# Patient Record
Sex: FEMALE | Race: BLACK OR AFRICAN AMERICAN | Employment: UNEMPLOYED | URBAN - METROPOLITAN AREA
[De-identification: names, ages, dates, MRNs, and addresses within clinical notes are randomized per-mention and may not be internally consistent; named-entity substitution may affect disease eponyms.]

---

## 2020-07-29 ENCOUNTER — HOSPITAL ENCOUNTER (OUTPATIENT)
Dept: GENERAL RADIOLOGY | Age: 55
Discharge: HOME | End: 2020-07-29

## 2020-07-29 DIAGNOSIS — R10.10 PAIN OF UPPER ABDOMEN: ICD-10-CM

## 2020-07-29 DIAGNOSIS — J41.0 SIMPLE CHRONIC BRONCHITIS (HCC): ICD-10-CM

## 2020-07-29 DIAGNOSIS — M54.50 CHRONIC BILATERAL LOW BACK PAIN WITHOUT SCIATICA: ICD-10-CM

## 2020-07-29 DIAGNOSIS — G89.29 CHRONIC BILATERAL LOW BACK PAIN WITHOUT SCIATICA: ICD-10-CM

## 2020-07-29 PROBLEM — Z79.899 ENCOUNTER FOR LONG-TERM (CURRENT) USE OF OTHER MEDICATIONS: Status: ACTIVE | Noted: 2020-07-29

## 2020-07-29 PROBLEM — E78.2 MIXED HYPERLIPIDEMIA: Status: ACTIVE | Noted: 2020-07-29

## 2020-07-29 PROBLEM — E66.09 CLASS 1 OBESITY DUE TO EXCESS CALORIES WITH SERIOUS COMORBIDITY AND BODY MASS INDEX (BMI) OF 32.0 TO 32.9 IN ADULT: Status: ACTIVE | Noted: 2020-07-29

## 2020-07-29 PROBLEM — E11.9 DIABETES MELLITUS (HCC): Status: ACTIVE | Noted: 2020-07-29

## 2020-07-29 PROBLEM — I10 ESSENTIAL HYPERTENSION: Status: ACTIVE | Noted: 2020-07-29

## 2021-06-16 ENCOUNTER — HOSPITAL ENCOUNTER (EMERGENCY)
Age: 56
Discharge: HOME | End: 2021-06-16
Attending: EMERGENCY MEDICINE
Payer: COMMERCIAL

## 2021-06-16 VITALS
HEIGHT: 64 IN | RESPIRATION RATE: 20 BRPM | HEART RATE: 83 BPM | BODY MASS INDEX: 30.73 KG/M2 | OXYGEN SATURATION: 100 % | WEIGHT: 180 LBS | DIASTOLIC BLOOD PRESSURE: 111 MMHG | TEMPERATURE: 98.8 F | SYSTOLIC BLOOD PRESSURE: 202 MMHG

## 2021-06-16 DIAGNOSIS — L03.011 FELON OF FINGER OF RIGHT HAND: Primary | ICD-10-CM

## 2021-06-16 PROCEDURE — 96372 THER/PROPH/DIAG INJ SC/IM: CPT

## 2021-06-16 PROCEDURE — 74011250636 HC RX REV CODE- 250/636: Performed by: EMERGENCY MEDICINE

## 2021-06-16 PROCEDURE — 99282 EMERGENCY DEPT VISIT SF MDM: CPT

## 2021-06-16 RX ORDER — OXYCODONE AND ACETAMINOPHEN 7.5; 325 MG/1; MG/1
1 TABLET ORAL
Qty: 12 TABLET | Refills: 0 | Status: SHIPPED | OUTPATIENT
Start: 2021-06-16 | End: 2021-06-19

## 2021-06-16 RX ORDER — CEPHALEXIN 500 MG/1
500 CAPSULE ORAL 2 TIMES DAILY
Qty: 14 CAPSULE | Refills: 0 | Status: SHIPPED | OUTPATIENT
Start: 2021-06-16 | End: 2021-06-23

## 2021-06-16 RX ORDER — MORPHINE SULFATE 10 MG/ML
10 INJECTION, SOLUTION INTRAMUSCULAR; INTRAVENOUS
Status: COMPLETED | OUTPATIENT
Start: 2021-06-16 | End: 2021-06-16

## 2021-06-16 RX ADMIN — MORPHINE SULFATE 10 MG: 10 INJECTION INTRAVENOUS at 21:05

## 2021-06-17 NOTE — ED PROVIDER NOTES
Patient is a 54-year-old female presented emerged from today complaining of pain to the right thumb. The patient states that she had her nails done 2 weeks ago and ever since then she has had pain on the right thumb but it is progressively worsened and gotten more swollen. Patient denies any fevers or chills. The patient said that she tried taking Lortab 5 mg for pain that she got from a friend and it helped calm the pain down but only for about an hour and then it came back. Past Medical History:   Diagnosis Date    Arthritis     Chronic pain     Diabetes ([address]     Hypertension     Mixed hyperlipidemia 7/29/2020       Past Surgical History:   Procedure Laterality Date    HX HYSTERECTOMY      HX ORTHOPAEDIC           Family History:   Problem Relation Age of Onset    Hypertension Mother     Hypertension Father     Hypertension Paternal Uncle     Diabetes Paternal Uncle        Social History     Socioeconomic History    Marital status:      Spouse name: Not on file    Number of children: Not on file    Years of education: Not on file    Highest education level: Not on file   Occupational History    Not on file   Tobacco Use    Smoking status: Current Every Day Smoker    Smokeless tobacco: Never Used   Substance and Sexual Activity    Alcohol use: Yes    Drug use: Never    Sexual activity: Yes     Partners: Male     Birth control/protection: None   Other Topics Concern    Not on file   Social History Narrative    Not on file     Social Determinants of Health     Financial Resource Strain:     Difficulty of Paying Living Expenses:    Food Insecurity:     Worried About Running Out of Food in the Last Year:     [address] in the Last Year:    Transportation Needs:     Lack of Transportation (Medical):      Lack of Transportation (Non-Medical):    Physical Activity:     Days of Exercise per Week:     Minutes of Exercise per Session:    Stress:     Feeling of Stress :    Social Connections:     Frequency of Communication with Friends and Family:     Frequency of Social Gatherings with Friends and Family:     Attends Yarsanism Services:     Active Member of Clubs or Organizations:     Attends Club or Organization Meetings:     Marital Status:    Intimate Partner Violence:     Fear of Current or Ex-Partner:     Emotionally Abused:     Physically Abused:     Sexually Abused: ALLERGIES: Propoxyphene and Propoxyphene n-acetaminophen    Review of Systems   Constitutional: Negative. Musculoskeletal: Positive for joint swelling. Skin: Positive for color change and wound. Neurological: Negative. Hematological: Negative. Vitals:    06/16/21 2043   BP: (!) 202/111   Pulse: 83   Resp: 20   Temp: 98.8 °F (37.1 °C)   SpO2: 100%   Weight: 81.6 kg (180 lb)   Height: 5' 4\" (1.626 m)            Physical Exam     GENERAL:The patient has Body mass index is 30.9 kg/m². Well-hydrated. Moderate discomfort  VITAL SIGNS: Heart rate, blood pressure, respiratory rate reviewed as recorded in  nurse's notes  EYES: Pupils reactive. Extraocular motion intact. No conjunctival redness or drainage. LUNGS: No accessory muscle use  CARDIOVASCULAR: Regular rate and rhythm  EXTREMITIES: Patient has limited range of motion with flexion of the right thumb secondary to pain. She has swelling over this area shown in the photos. No paronychia appreciated. NEUROLOGIC: Cranial nerve exam reveals face is symmetrical, tongue is midline  speech is clear. No focal deficits noted  SKIN: No rash or petechiae. Good skin turgor palpated. PSYCHIATRIC: Alert and oriented. Appropriate behavior and judgment.                 MDM  Number of Diagnoses or Management Options  Diagnosis management comments: Paronychia, felon,         Amount and/or Complexity of Data Reviewed  Tests in the medicine section of CPT®: reviewed and ordered           Procedures

## 2021-06-17 NOTE — DISCHARGE INSTRUCTIONS
When you call the orthopedic number in the morning tell them what you were diagnosed with and they will schedule an appointment to follow-up with hand specialist.  Start taking the antibiotics and using the pain medicine as directed. Risk of opioid analgesics include, but are not limited to: Overdose they can stop or slow your breathing and lead to death; fractures from falls; drowsiness leading to injury; tolerance, dependence and addiction. You should not operate any motorized vehicles or work from a height greater than ground level when taking opioid analgesics as they increase your fall risks.

## 2021-06-17 NOTE — ED TRIAGE NOTES
Arrives with face mask in place. Reports right thumb pain, onset approx 2 weeks ago after having acrylic fingernails done. Removed nail without relief of symptoms. Reports swelling to site. Attempted OTC meds as well as norco from friend without relief.

## 2021-09-24 ENCOUNTER — TELEPHONE (OUTPATIENT)
Dept: PHARMACY | Age: 56
End: 2021-09-24

## 2021-09-24 NOTE — TELEPHONE ENCOUNTER
CLINICAL PHARMACY: ADHERENCE REVIEW  Identified care gap per Sanjiv; fills at [facility]: ACE/ARB adherence    Last Visit: 20    Patient identified as LIS = 2, therefore patient may be able to receive a 90-day supply for the same cost as a 30-day supply    Patient also appears to be prescribed: metformin, pravastatin    ASSESSMENT  ACE/ARB ADHERENCE    Per Insurance Records through 21 ( [facility] = n/a; YTD PDC = 75%; Potential Fail Date: 21):   Lisinopril 10 mg tab last filled on 21 for 90 day supply. Next refill due: 21  Lisinopril-HCTZ 10-12.5 mg tab also on med list    Per current med list:  Medication Sig    lisinopriL (PRINIVIL, ZESTRIL) 10 mg tablet Take 1 Tab by mouth daily. - last Rx 20, Rx now ?  lisinopril-hydroCHLOROthiazide (PRINZIDE, ZESTORETIC) 10-12.5 mg per tablet Take 1 Tab by mouth daily. - last Rx 20, Rx now ? Per Reconciled Dispense Report: 3/8/21 to 21  LISINOPRIL  10 MG TABS 2021 90 90 Tablet TE QUIROGA [id number] [place] . .. Per [facility]:   Lisinopril last picked up as above. Would need new Rx for refill    BP Readings from Last 3 Encounters:   21 (!) 202/111   20 130/74   20 130/76     CrCl cannot be calculated (Patient's most recent lab result is older than the maximum 180 days allowed. ). DIABETES ADHERENCE    Per Insurance Records through 21: no claims yet in      Per current med list:  Medication Sig    metFORMIN (GLUCOPHAGE) 500 mg tablet Take 1 Tab by mouth two (2) times a day. - last Rx 20, Rx now ?      Per [facility]:   Metformin Rx now     Lab Results   Component Value Date/Time    Hemoglobin A1c 5.9 (H) 2020 02:27 PM    Hemoglobin A1c 5.8 2018 11:48 AM    Hemoglobin A1c 6.1 (H) 2018 12:22 PM     NOTE A1c not yet completed this calendar year    [facility]    Per Insurance Records through 21: no claims yet in     Per med list:  Medication Sig    pravastatin (PRAVACHOL) 40 mg tablet Take 1 Tab by mouth nightly. - Last Rx 21, Rx now ? Per [facility]:   Pravastatin Rx now     Lab Results   Component Value Date/Time    Cholesterol, total 198 2020 02:27 PM    HDL Cholesterol 58 2020 02:27 PM    DIRECT LDL CHOLESTEROL 121 2018 12:22 PM    LDL, calculated 116 (H) 2020 02:27 PM    VLDL, calculated 24 2020 02:27 PM    Triglyceride 119 2020 02:27 PM     ALT (SGPT)   Date Value Ref Range Status   2020 17 0 - 32 IU/L Final     AST (SGOT)   Date Value Ref Range Status   2020 17 0 - 40 IU/L Final     The ASCVD Risk score (Gilman Aase., et al., 2013) failed to calculate for the following reasons: The valid systolic blood pressure range is 90 to 200 mmHg     PLAN  The following are interventions that have been identified:  - Patient overdue refilling lisinopril and active on home medication list   · Lisinopril & lisinopril-HCTZ, and HCTZ all on med list (total dose lisinopril 20 mg daily, HCTZ 25) mg daily - would like to confirm use? · Will need refill Rx - Rxs now   - no claims yet for metformin or pravastatin but on medication list; Rxs now     Attempting to reach patient to review.  Left message asking for return call. Letter also sent. No future appointments.     Arias Justin, PharmD, [facility]  Department, toll free: [phone]

## 2021-09-24 NOTE — LETTER
[address]  [address], [place] [address]        Aye Prakash [address]  [address] [address]           21     Dear Reji Gabriel,    We tried to reach you recently regarding your lisinopril, but were unable to reach you on the telephone. We have on file that you are currently taking lisinopril 10 mg tablet - 1 tablet everyday. If you are no longer taking or taking differently, please call us at the number below so that we can discuss this and update your medication profile. It appears that this medication has not been filled at proper times and may be overdue to be refilled. We are worried you might be missing doses or not taking it as directed. It is important that you take your medications regularly and try not to miss a single dose. We also wanted to see if you have some of your other medications at home since it appears several of your prescriptions are now  and new prescriptions will be needed to refill them. Please contact us to discuss your medications further.     Sincerely,   Elver Gilliam PharmD, [facility]  Department, toll free: [phone]

## 2021-09-27 NOTE — TELEPHONE ENCOUNTER
Another attempt made to reach patient. No answer, LM. Letter already sent.     For Pharmacy Admin Tracking Only     Time Spent (min): 10

## 2022-03-18 PROBLEM — R10.10 PAIN OF UPPER ABDOMEN: Status: ACTIVE | Noted: 2020-07-29

## 2022-03-19 PROBLEM — Z79.899 ENCOUNTER FOR LONG-TERM (CURRENT) USE OF OTHER MEDICATIONS: Status: ACTIVE | Noted: 2020-07-29

## 2022-03-19 PROBLEM — E66.09 CLASS 1 OBESITY DUE TO EXCESS CALORIES WITH SERIOUS COMORBIDITY AND BODY MASS INDEX (BMI) OF 32.0 TO 32.9 IN ADULT: Status: ACTIVE | Noted: 2020-07-29

## 2022-03-19 PROBLEM — I10 ESSENTIAL HYPERTENSION: Status: ACTIVE | Noted: 2020-07-29

## 2022-03-19 PROBLEM — E11.9 DIABETES MELLITUS (HCC): Status: ACTIVE | Noted: 2020-07-29

## 2022-03-20 PROBLEM — E78.2 MIXED HYPERLIPIDEMIA: Status: ACTIVE | Noted: 2020-07-29

## 2022-04-26 ENCOUNTER — TELEPHONE (OUTPATIENT)
Dept: PHARMACY | Age: 57
End: 2022-04-26

## 2022-04-26 NOTE — LETTER
[address]  [address], [place] [address]        [place] [address] [place] [address]           04/26/22     Dear Brad Escalera,    We tried to reach you recently regarding some of your medications, but were unable to reach you on the telephone. We have on file that you are currently taking:  · Lisinopril-HCTZ 10-12.5 mg tablet - 1 tablet everyday  · Metformin 500 mg tablet - 1 tablet 2 times per day  · Pravastatin 40 mg tablet - 1 tablet everyday  If you are no longer taking or taking differently, please call us at the number below so that we can discuss this and update your medication profile. It appears that these medications have not been filled at proper times. We are worried you might be missing doses or not taking them as directed. It is important that you take your medications regularly and try not to miss a single dose. Please contact us to discuss further or follow up with your [facility] pharmacy to refill them.     Some ways to help you remember to take and refill your medications are to:  · Use a pill box, set an alarm, and/or keep your medication near something that you do every day  · Ask your pharmacy if they participate in [employer]", a program where you can  all of your medications on the same day  · Ask your pharmacy if you can be set up with automatic refill, where they will automatically refill your prescription when it is due and let you know it's ready to     Sincerely,   Maricarmen Garcia, PharmD, [facility]  Department, toll free: [phone]

## 2022-04-26 NOTE — TELEPHONE ENCOUNTER
[facility] CLINICAL PHARMACY: ADHERENCE REVIEW  Identified care gap per [employer]: fills at [facility]: ACE/ARB, Diabetes and Statin adherence    Last Visit: 1/5/22    Patient identified as LIS = 2, therefore patient may be able to receive a 90-day supply for the same cost as a 30-day supply    ASSESSMENT  ACE/ARB ADHERENCE    Insurance Records claims through 4/24/22 (2021 [place] = n/a; [place] = Filled only once; Potential Fail Date: 6/16/22): Lisinopril-HCTZ last filled on 1/5/22 for 90 day supply. Next refill due: 4/5/22    Per Reconciled Dispense Report: 10/8/21 to 1/7/22  LISINOPRIL/HYDROCHLOROTHIAZIDE  10-12.5 MG TABS 01/05/2022 90 90 Tablet KIMBER,TE [id number] [place] . .. Per [facility]:   Lisinopril-HCTZ RTS on 4/12/22. BP Readings from Last 3 Encounters:   01/05/22 (!) 187/100   06/16/21 (!) 202/111   08/24/20 130/74     Estimated Creatinine Clearance: 94.6 mL/min (by C-G formula based on SCr of 0.67 mg/dL). DIABETES ADHERENCE    Insurance Records claims through 4/24/22 (2021 [place] = n/a; [place] = Filled only once; Potential Fail Date: 5/2/22): Metformin last filled on 1/5/22 for 45 day supply. Next refill due: 2/19/22    Per Reconciled Dispense Report: 10/8/21 to 1/7/22  METFORMIN HYDROCHLORIDE  500 MG TABS 01/05/2022 45 90 Tablet KIMBER,TE [id number] [place] . .. Per [facility]:   Metformin RTS on 4/12/22    Lab Results   Component Value Date/Time    Hemoglobin A1c 6.0 (H) 01/05/2022 04:18 PM    Hemoglobin A1c 5.9 (H) 07/29/2020 02:27 PM    Hemoglobin A1c 5.8 07/17/2018 11:48 AM     NOTE A1c <9%    [id number] Records claims through 4/24/22 (2021 [place] = n/a; YTD PDC = Filled only once; Potential Fail Date: 6/16/22):   Pravastatin last filled on 1/5/22 for 90 day supply. Next refill due: 4/5/22    Per Reconciled Dispense Report: 10/8/21 to 1/7/22  PRAVASTATIN SODIUM  40 MG TABS 01/05/2022 90 90 Tablet TE QUIROGA [id number] [place] . ..      Per [facility]:   Pravastatin RTS on 4/12/22. Lab Results   Component Value Date/Time    Cholesterol, total 241 (H) 01/05/2022 04:18 PM    HDL Cholesterol 73 01/05/2022 04:18 PM    DIRECT LDL CHOLESTEROL 121 03/07/2018 12:22 PM    LDL, calculated 152 (H) 01/05/2022 04:18 PM    LDL, calculated 116 (H) 07/29/2020 02:27 PM    VLDL, calculated 16 01/05/2022 04:18 PM    VLDL, calculated 24 07/29/2020 02:27 PM    Triglyceride 92 01/05/2022 04:18 PM     ALT (SGPT)   Date Value Ref Range Status   01/05/2022 15 0 - 32 IU/L Final     AST (SGOT)   Date Value Ref Range Status   01/05/2022 16 0 - 40 IU/L Final     The 10-year ASCVD risk score (Chapito Clarke., et al., 2013) is: 61.4%    Values used to calculate the score:      Age: 61 years      Sex: Female      Is Non- : Yes      Diabetic: Yes      Tobacco smoker: Yes      Systolic Blood Pressure: 927 mmHg      Is BP treated: Yes      HDL Cholesterol: 73 mg/dL      Total Cholesterol: 241 mg/dL     PLAN  The following are interventions that have been identified:  - Patient overdue refilling lisinopril-HCTZ, metformin, pravastatin and active on home medication list   - Awaiting  at pharmacy with albuterol Rx    Attempting to reach patient to review.  Left message asking for return call. Specifically asked for call back if she is having troubles with medications, otherwise she had mentioned she was planning to  last month due to being about out. Will send letter to encourage adherence. No future appointments.     Geovanny Hdz, PharmD, [facility]  Department, toll free: [phone]

## 2022-04-29 NOTE — TELEPHONE ENCOUNTER
Another attempt made to reach patient, no answer. Letter already sent.     For Pharmacy [address] in place: No   Recommendation Provided To: Pharmacy: 3   Intervention Detail: Adherence Monitoring: 3   Gap Closed?: No   Intervention Accepted By: Pharmacy: 3   Time Spent (min): 10

## 2022-06-01 ENCOUNTER — TELEPHONE (OUTPATIENT)
Dept: PHARMACY | Age: 57
End: 2022-06-01

## 2022-06-01 ENCOUNTER — TELEPHONE (OUTPATIENT)
Dept: PHARMACY | Facility: CLINIC | Age: 57
End: 2022-06-01

## 2022-06-01 NOTE — TELEPHONE ENCOUNTER
[facility] CLINICAL PHARMACY: ADHERENCE REVIEW  Identified care gap per [employer]: fills at [facility]: ACE/ARB, Diabetes and Statin adherence    Last Visit: 1/5/22    Patient identified as LIS = 2, therefore patient may be able to receive a 90-day supply for the same cost as a 30-day supply    ASSESSMENT  ACE/ARB ADHERENCE    Insurance Records claims through 5/23/22 (Prior Year Jose Daniel Garciaandra = n/a%; YTD [place] = Filled only once; Potential Fail Date: 6/16/22): Lisinopril-HCTZ 10-12.5 mg tab last filled on 1/5/22 for 90 day supply. Next refill due: 4/5/22    Per Reconciled Dispense Report:  LISINOPRIL/HYDROCHLOROTHIAZIDE  10-12.5 MG TABS 05/27/2022 90 90 tablet CAMRON,KRYSTYNA [id number] [place] . .. LISINOPRIL/HYDROCHLOROTHIAZIDE  10-12.5 MG TABS 01/05/2022 90 90 tablet CAMRON,KRYSTYNA [id number] [place] . .. BP Readings from Last 3 Encounters:   01/05/22 (!) 187/100     Estimated Creatinine Clearance: 99 mL/min (based on SCr of 0.67 mg/dL). DIABETES ADHERENCE    Insurance Records claims through 5/23/22 (Prior Year Jose Daniel Huff = n/a%; YTD [place] = Filled only once; Potential Fail Date: IMPOSSIBLE IN 2022): Metformin 500 mg tab last filled on 1/5/22 for 45 day supply. Next refill due: 2/19/22    Per Reconciled Dispense Report:  METFORMIN HYDROCHLORIDE  500 MG TABS 05/27/2022 45 90 tablet CAMRON,KRYSTYNA [id number] [place] . .. METFORMIN HYDROCHLORIDE  500 MG TABS 01/05/2022 45 90 tablet CAMRON,KRYSTYNA [id number] [place] . .. Lab Results   Component Value Date    LABA1C 6.0 (H) 01/05/2022    LABA1C 5.9 (H) 07/29/2020     NOTE A1c <9%    STATIN ADHERENCE    Insurance Records claims through 5/23/22 (Prior Year PDC = n/a%; YTD PDC = Filled only once; Potential Fail Date: 6/16/22):   Pravastatin 40 mg tab last filled on 1/5/22 for 90 day supply. Next refill due: 4/5/22    Per Reconciled Dispense Report:  PRAVASTATIN SODIUM  40 MG TABS 05/27/2022 90 90 tablet KRYSTYNA LYON [id number] [place] . ..    PRAVASTATIN SODIUM  40 MG TABS 01/05/2022 90 90 tablet KRYSTYNA LYON [id number] [place] . .. Lab Results   Component Value Date    CHOL 241 (H) 01/05/2022    TRIG 92 01/05/2022    HDL 73 01/05/2022    LDLCALC 152 (H) 01/05/2022     ALT   Date Value Ref Range Status   01/05/2022 15 0 - 32 IU/L Final     AST   Date Value Ref Range Status   01/05/2022 16 0 - 40 IU/L Final     The 10-year ASCVD risk score (Armen Becker, et al., 2013) is: 61.4%    Values used to calculate the score:      Age: 61 years      Sex: Female      Is Non- : Yes      Diabetic: Yes      Tobacco smoker: Yes      Systolic Blood Pressure: 633 mmHg      Is BP treated: Yes      HDL Cholesterol: 73 mg/dL      Total Cholesterol: 241 mg/dL     PLAN  The following are interventions that have been identified:   - Patient overdue refilling lisinopril-HCTZ, metformin, pravastatin and active on home medication list.    - did not call pharmacy today - had been filled 2 times previously and RTS since pt did not ; appears all possibly processed 5/27/22 at [facility] but no claim in [facility] yet at time of writing    Attempting to reach patient to review.  Left message asking for return call. Have been unsuccessful reaching patient after multiple attempts. Will send another letter. No future appointments.     Rogelio Tidwell, PharmD, [facility]  Department, toll free: [phone]     For Pharmacy Admin Tracking Only     Gap Closed?: No    Time Spent (min): 10

## 2022-06-01 NOTE — LETTER
[address]  [address], [address]        [facility] [address] [place] [address]           06/01/22     Dear Rick Mendosa,    We tried to reach you recently regarding some of your maintenance medicatiosn, but were unable to reach you on the telephone. We have on file that you are currently taking:  - lisinopril-hydrochlorothiazide 10-12.5 mg tablet - 1 tablet everyday  - metformin 500 mg tablet - 1 tablet 2 times per day  - pravastatin 40 mg tablet - 1 tablet everyday  If you are no longer taking or taking differently, please call us at the number below so that we can discuss this and update your medication profile. We wanted to make sure you have these medications at home and that you are not having any troubles with them. It appears that these medications have not been filled at proper times. We are worried you might be missing doses or not taking it as directed. It is important that you take your medications regularly and try not to miss a single dose.     Some ways to help you remember to take and refill your medications are to:  · Use a pill box, set an alarm, and/or keep your medication near something that you do every day  · Ask your pharmacy if they participate in [facility]", a program where you can  all of your medications on the same day  · Ask your pharmacy if you can be set up with automatic refill, where they will automatically refill your prescription when it is due and let you know it's ready to     Sincerely,   Cande Asencio, KathyD, [facility]  Department, toll free: [phone]

## 2022-09-01 RX ORDER — LISINOPRIL AND HYDROCHLOROTHIAZIDE 12.5; 1 MG/1; MG/1
1 TABLET ORAL DAILY
Qty: 90 TABLET | Refills: 1 | Status: SHIPPED | OUTPATIENT
Start: 2022-09-01

## 2022-09-01 NOTE — TELEPHONE ENCOUNTER
Melquiades Grimaldo MD, patient out of refills of lisinopril-HCTZ. Rx pended for your signature/modification as appropriate    LOV: 1/5/22  Next: to be scheduled (please have staff assist if needing appt for refills)    Thank you,  Angelica Hdz PharmD, [facility]  Department, toll free: [phone] option 2  ====================================================================  POPULATION [facility] CLINICAL PHARMACY: ADHERENCE REVIEW  Identified care gap per [employer]: fills at [facility] : ACE/ARB, Diabetes, and Statin adherence    Last Visit: 1/5/22    Patient identified as LIS = 2, therefore patient may be able to receive a 90-day supply for the same cost as a 30-day supply    ASSESSMENT  ACE/ARB ADHERENCE    Insurance Records claims through 8/19/22 (Prior Year Jose Daniel Huff = FAILED; YTD Jose Daniel Huff =  77%; Potential Fail Date: 9/14/22 ):   Lisinopril-HCTZ 10-12.5 mg tab last filled on 5/27/22 for 90 day supply. Next refill due: 8/25/22    Per Reconciled Dispense Report:  LISINOPRIL/HYDROCHLOROTHIAZIDE  10-12.5 MG TABS 05/27/2022 90 90 tablet CAMRON,KRYSTYNA [id number] [place] . .. LISINOPRIL/HYDROCHLOROTHIAZIDE  10-12.5 MG TABS 01/05/2022 90 90 tablet CAMRON,KRYSTYNA [id number] [place] . .. Last Rx 3/28/22 x 90 ds 0 refills    BP Readings from Last 3 Encounters:   01/05/22 (!) 187/100     CrCl cannot be calculated (Patient's most recent lab result is older than the maximum 180 days allowed. ). DIABETES ADHERENCE    Insurance Records claims through 8/19/22 (Prior Year Jose Daniel Huff = n/a; YTD Jose Daniel Davisra =  39%; Failed in 2022): Metformin 500 mg tab last filled on 5/27/22 for 45 day supply (90 tabs). Next refill due: 7/11/22    Per Reconciled Dispense Report:  METFORMIN HYDROCHLORIDE  500 MG TABS 05/27/2022 45 90 tablet KRYSTYNA LYON [id number] [place] . ..    METFORMIN HYDROCHLORIDE  500 MG TABS          Lab Results   Component Value Date    LABA1C 6.0 (H) 01/05/2022    LABA1C 5.9 (H) 07/29/2020     NOTE A1c <9%    STATIN ADHERENCE    Insurance Records claims through 8/19/22 (Prior Year PDC = n/a; YTD [place] =  77%; Potential Fail Date: 9/14/22 ):   Pravastatin 40 mg last filled on 5/27/22 for 90 day supply. Next refill due: 8/25/22    Per Reconciled Dispense Report:  PRAVASTATIN SODIUM  40 MG TABS 05/27/2022 90 90 tablet CAMRON,KRYSTYNA [id number] [place] . .. PRAVASTATIN SODIUM  40 MG TABS 01/05/2022 90 90 tablet CAMRON,KRYSTYNA [id number] [place] . .. Last Rx 1/5/22 x 90 ds 5 refills, believe should have refill on file    Lab Results   Component Value Date    CHOL 241 (H) 01/05/2022    TRIG 92 01/05/2022    HDL 73 01/05/2022    LDLCALC 152 (H) 01/05/2022     ALT   Date Value Ref Range Status   01/05/2022 15 0 - 32 IU/L Final     AST   Date Value Ref Range Status   01/05/2022 16 0 - 40 IU/L Final     The 10-year ASCVD risk score (Odell Mccall, et al., 2013) is: 61.4%    Values used to calculate the score:      Age: 61 years      Sex: Female      Is Non- : Yes      Diabetic: Yes      Tobacco smoker: Yes      Systolic Blood Pressure: 911 mmHg      Is BP treated: Yes      HDL Cholesterol: 73 mg/dL      Total Cholesterol: 241 mg/dL     PLAN  The following are interventions that have been identified:   - Patient overdue refilling pravastatin, lisinopril-HCTZ and active on home medication list.   Believe pravastatin should have refill  Lisinopril-HCTZ believe no refills left for 90 days (LIS - cost benefit)  - already failed DM adherence, also overdue to refill metformin    Number not accepting calls at this time. No future appointments.     Deanne Glass, PharmD, [facility]  Department, toll free: [phone] option 2

## 2022-09-01 NOTE — LETTER
[address]  [address], [place]        [place] [address] [place]  [address] [address]           09/02/22     Dear Renny Hansen,    We tried to reach you recently regarding some of your medications, but were unable to reach you on the telephone. We have on file that you are currently taking lisinopril-hydrochlorothiazide 10-12.5 mg tablet - 1 tablet everyday; pravastatin 40 mg tablet - 1 tablet everyday. If you are no longer taking or taking differently, please call us at the number below so that we can discuss this and update your medication profile. We wanted to make sure you have these medications at home and that you are not having any troubles with them. It appears that these medications may be due to be refilled. Please contact us to discuss further or follow up with your [facility] pharmacy to refill them.     Some ways to help you remember to take and refill your medications are to:  · Use a pill box, set an alarm, and/or keep your medication near something that you do every day  · Ask your pharmacy if they participate in [facility]", a program where you can  all of your medications on the same day  · Ask your pharmacy if you can be set up with automatic refill, where they will automatically refill your prescription when it is due and let you know it's ready to     Sincerely,   Prateek Davis, PharmD, [facility]  Department, toll free: [phone] option 2

## 2022-09-02 NOTE — TELEPHONE ENCOUNTER
Noted Rx signed by provider - thank you! Another attempt made to reach patient. No answer, was able to LM this time. Called to [facility]. Confirmed lisinopril-HCTZ in process. They will also get pravastatin ready, there were 4 refills.     For Pharmacy Admin Tracking Only    CPA in place:  No  Recommendation Provided To: Provider: 1 via Note to Provider and Pharmacy: 1  Intervention Detail: Adherence Monitorin and Refill(s) Provided  Gap Closed?: No   Intervention Accepted By: Provider: 1 and Pharmacy: 1  Time Spent (min): 15

## 2022-10-18 RX ORDER — FAMOTIDINE 20 MG/1
TABLET, FILM COATED ORAL
Qty: 60 TABLET | Refills: 1 | Status: SHIPPED | OUTPATIENT
Start: 2022-10-18

## 2022-10-18 RX ORDER — AMLODIPINE BESYLATE 5 MG/1
TABLET ORAL
Qty: 90 TABLET | Refills: 5 | Status: SHIPPED | OUTPATIENT
Start: 2022-10-18

## 2023-03-13 RX ORDER — PRAVASTATIN SODIUM 40 MG
TABLET ORAL
Qty: 90 TABLET | Refills: 5 | OUTPATIENT
Start: 2023-03-13

## 2023-07-14 RX ORDER — LISINOPRIL AND HYDROCHLOROTHIAZIDE 12.5; 1 MG/1; MG/1
TABLET ORAL
Qty: 90 TABLET | Refills: 3 | Status: SHIPPED | OUTPATIENT
Start: 2023-07-14

## 2023-09-27 ENCOUNTER — OFFICE VISIT (OUTPATIENT)
Dept: PRIMARY CARE CLINIC | Facility: CLINIC | Age: 58
End: 2023-09-27
Payer: COMMERCIAL

## 2023-09-27 VITALS
OXYGEN SATURATION: 98 % | HEART RATE: 60 BPM | DIASTOLIC BLOOD PRESSURE: 103 MMHG | BODY MASS INDEX: 34.49 KG/M2 | SYSTOLIC BLOOD PRESSURE: 165 MMHG | TEMPERATURE: 97.8 F | WEIGHT: 202 LBS | HEIGHT: 64 IN | RESPIRATION RATE: 15 BRPM

## 2023-09-27 DIAGNOSIS — F32.A ANXIETY AND DEPRESSION: ICD-10-CM

## 2023-09-27 DIAGNOSIS — M54.50 CHRONIC BILATERAL LOW BACK PAIN, UNSPECIFIED WHETHER SCIATICA PRESENT: ICD-10-CM

## 2023-09-27 DIAGNOSIS — R70.0 ELEVATED SEDIMENTATION RATE: ICD-10-CM

## 2023-09-27 DIAGNOSIS — F41.9 ANXIETY AND DEPRESSION: ICD-10-CM

## 2023-09-27 DIAGNOSIS — E66.09 CLASS 1 OBESITY DUE TO EXCESS CALORIES WITH SERIOUS COMORBIDITY AND BODY MASS INDEX (BMI) OF 34.0 TO 34.9 IN ADULT: ICD-10-CM

## 2023-09-27 DIAGNOSIS — Z12.31 ENCOUNTER FOR SCREENING MAMMOGRAM FOR MALIGNANT NEOPLASM OF BREAST: ICD-10-CM

## 2023-09-27 DIAGNOSIS — Z11.4 ENCOUNTER FOR SCREENING FOR HIV: ICD-10-CM

## 2023-09-27 DIAGNOSIS — Z12.11 COLON CANCER SCREENING: ICD-10-CM

## 2023-09-27 DIAGNOSIS — I10 UNCONTROLLED HYPERTENSION: ICD-10-CM

## 2023-09-27 DIAGNOSIS — R29.898 WEAKNESS OF BOTH LOWER EXTREMITIES: ICD-10-CM

## 2023-09-27 DIAGNOSIS — M25.50 MULTIPLE JOINT PAIN: ICD-10-CM

## 2023-09-27 DIAGNOSIS — Z11.59 ENCOUNTER FOR HEPATITIS C SCREENING TEST FOR LOW RISK PATIENT: ICD-10-CM

## 2023-09-27 DIAGNOSIS — R20.2 NUMBNESS AND TINGLING: Primary | ICD-10-CM

## 2023-09-27 DIAGNOSIS — Z91.89 MULTIPLE RISK FACTORS FOR CORONARY ARTERY DISEASE: ICD-10-CM

## 2023-09-27 DIAGNOSIS — G89.29 CHRONIC BILATERAL LOW BACK PAIN, UNSPECIFIED WHETHER SCIATICA PRESENT: ICD-10-CM

## 2023-09-27 DIAGNOSIS — R73.03 PRE-DIABETES: ICD-10-CM

## 2023-09-27 DIAGNOSIS — R06.09 EXERTIONAL DYSPNEA: ICD-10-CM

## 2023-09-27 DIAGNOSIS — Z00.01 ENCOUNTER FOR MEDICARE ANNUAL EXAMINATION WITH ABNORMAL FINDINGS: ICD-10-CM

## 2023-09-27 DIAGNOSIS — F17.210 CIGARETTE SMOKER: ICD-10-CM

## 2023-09-27 DIAGNOSIS — R20.0 NUMBNESS AND TINGLING: Primary | ICD-10-CM

## 2023-09-27 DIAGNOSIS — E78.2 MIXED HYPERLIPIDEMIA: ICD-10-CM

## 2023-09-27 LAB
ALBUMIN SERPL-MCNC: 3.6 G/DL (ref 3.5–5)
ALBUMIN/GLOB SERPL: 0.9 (ref 0.4–1.6)
ALP SERPL-CCNC: 85 U/L (ref 50–136)
ALT SERPL-CCNC: 40 U/L (ref 12–65)
ANION GAP SERPL CALC-SCNC: 5 MMOL/L (ref 2–11)
AST SERPL-CCNC: 28 U/L (ref 15–37)
BASOPHILS # BLD: 0 K/UL (ref 0–0.2)
BASOPHILS NFR BLD: 0 % (ref 0–2)
BILIRUB SERPL-MCNC: 0.4 MG/DL (ref 0.2–1.1)
BUN SERPL-MCNC: 12 MG/DL (ref 6–23)
CALCIUM SERPL-MCNC: 9.5 MG/DL (ref 8.3–10.4)
CHLORIDE SERPL-SCNC: 109 MMOL/L (ref 101–110)
CHOLEST SERPL-MCNC: 205 MG/DL
CO2 SERPL-SCNC: 27 MMOL/L (ref 21–32)
CREAT SERPL-MCNC: 0.7 MG/DL (ref 0.6–1)
DIFFERENTIAL METHOD BLD: ABNORMAL
EOSINOPHIL # BLD: 0.1 K/UL (ref 0–0.8)
EOSINOPHIL NFR BLD: 2 % (ref 0.5–7.8)
ERYTHROCYTE [DISTWIDTH] IN BLOOD BY AUTOMATED COUNT: 15.3 % (ref 11.9–14.6)
ERYTHROCYTE [SEDIMENTATION RATE] IN BLOOD: 63 MM/HR (ref 0–30)
GLOBULIN SER CALC-MCNC: 4.2 G/DL (ref 2.8–4.5)
GLUCOSE SERPL-MCNC: 98 MG/DL (ref 65–100)
HCT VFR BLD AUTO: 43.4 % (ref 35.8–46.3)
HCV AB SER QL: NONREACTIVE
HDLC SERPL-MCNC: 68 MG/DL (ref 40–60)
HDLC SERPL: 3
HGB BLD-MCNC: 13.6 G/DL (ref 11.7–15.4)
HIV 1+2 AB+HIV1 P24 AG SERPL QL IA: NONREACTIVE
HIV 1/2 RESULT COMMENT: NORMAL
IMM GRANULOCYTES # BLD AUTO: 0 K/UL (ref 0–0.5)
IMM GRANULOCYTES NFR BLD AUTO: 0 % (ref 0–5)
LDLC SERPL CALC-MCNC: 118 MG/DL
LYMPHOCYTES # BLD: 2.2 K/UL (ref 0.5–4.6)
LYMPHOCYTES NFR BLD: 42 % (ref 13–44)
MCH RBC QN AUTO: 25.4 PG (ref 26.1–32.9)
MCHC RBC AUTO-ENTMCNC: 31.3 G/DL (ref 31.4–35)
MCV RBC AUTO: 81 FL (ref 82–102)
MONOCYTES # BLD: 0.5 K/UL (ref 0.1–1.3)
MONOCYTES NFR BLD: 10 % (ref 4–12)
NEUTS SEG # BLD: 2.4 K/UL (ref 1.7–8.2)
NEUTS SEG NFR BLD: 46 % (ref 43–78)
NRBC # BLD: 0 K/UL (ref 0–0.2)
PLATELET # BLD AUTO: 189 K/UL (ref 150–450)
PMV BLD AUTO: ABNORMAL FL (ref 9.4–12.3)
POTASSIUM SERPL-SCNC: 4.6 MMOL/L (ref 3.5–5.1)
PROT SERPL-MCNC: 7.8 G/DL (ref 6.3–8.2)
RBC # BLD AUTO: 5.36 M/UL (ref 4.05–5.2)
SODIUM SERPL-SCNC: 141 MMOL/L (ref 133–143)
TRIGL SERPL-MCNC: 95 MG/DL (ref 35–150)
TSH, 3RD GENERATION: 1.97 UIU/ML (ref 0.36–3.74)
VIT B12 SERPL-MCNC: 891 PG/ML (ref 193–986)
VLDLC SERPL CALC-MCNC: 19 MG/DL (ref 6–23)
WBC # BLD AUTO: 5.2 K/UL (ref 4.3–11.1)

## 2023-09-27 PROCEDURE — 3077F SYST BP >= 140 MM HG: CPT | Performed by: FAMILY MEDICINE

## 2023-09-27 PROCEDURE — G8417 CALC BMI ABV UP PARAM F/U: HCPCS | Performed by: FAMILY MEDICINE

## 2023-09-27 PROCEDURE — 3080F DIAST BP >= 90 MM HG: CPT | Performed by: FAMILY MEDICINE

## 2023-09-27 PROCEDURE — G8427 DOCREV CUR MEDS BY ELIG CLIN: HCPCS | Performed by: FAMILY MEDICINE

## 2023-09-27 PROCEDURE — 3017F COLORECTAL CA SCREEN DOC REV: CPT | Performed by: FAMILY MEDICINE

## 2023-09-27 PROCEDURE — 99214 OFFICE O/P EST MOD 30 MIN: CPT | Performed by: FAMILY MEDICINE

## 2023-09-27 PROCEDURE — 4004F PT TOBACCO SCREEN RCVD TLK: CPT | Performed by: FAMILY MEDICINE

## 2023-09-27 PROCEDURE — G0439 PPPS, SUBSEQ VISIT: HCPCS | Performed by: FAMILY MEDICINE

## 2023-09-27 RX ORDER — FLUOXETINE HYDROCHLORIDE 20 MG/1
20 CAPSULE ORAL DAILY
Qty: 90 CAPSULE | Refills: 3 | Status: SHIPPED | OUTPATIENT
Start: 2023-09-27

## 2023-09-27 RX ORDER — PRAVASTATIN SODIUM 40 MG
40 TABLET ORAL DAILY
Qty: 90 TABLET | Refills: 5 | Status: SHIPPED | OUTPATIENT
Start: 2023-09-27

## 2023-09-27 RX ORDER — LISINOPRIL AND HYDROCHLOROTHIAZIDE 12.5; 1 MG/1; MG/1
1 TABLET ORAL DAILY
Qty: 90 TABLET | Refills: 5 | Status: SHIPPED | OUTPATIENT
Start: 2023-09-27

## 2023-09-27 RX ORDER — TIZANIDINE 2 MG/1
TABLET ORAL
Qty: 60 TABLET | Refills: 1 | Status: SHIPPED | OUTPATIENT
Start: 2023-09-27

## 2023-09-27 RX ORDER — AMLODIPINE BESYLATE 5 MG/1
5 TABLET ORAL DAILY
Qty: 90 TABLET | Refills: 5 | Status: SHIPPED | OUTPATIENT
Start: 2023-09-27

## 2023-09-27 RX ORDER — DULOXETIN HYDROCHLORIDE 30 MG/1
30 CAPSULE, DELAYED RELEASE ORAL DAILY
Qty: 30 CAPSULE | Refills: 3 | Status: SHIPPED | OUTPATIENT
Start: 2023-09-27

## 2023-09-27 RX ORDER — TOPIRAMATE 50 MG/1
50 TABLET, FILM COATED ORAL 2 TIMES DAILY
Qty: 60 TABLET | Refills: 3 | Status: SHIPPED | OUTPATIENT
Start: 2023-09-27

## 2023-09-27 ASSESSMENT — ENCOUNTER SYMPTOMS
DIARRHEA: 0
EYE DISCHARGE: 0
SINUS PRESSURE: 0
CHEST TIGHTNESS: 0
SHORTNESS OF BREATH: 0
VOICE CHANGE: 0
EYE REDNESS: 0
CHOKING: 0
SINUS PAIN: 0
VOMITING: 0
RHINORRHEA: 0
NAUSEA: 0
BACK PAIN: 1
WHEEZING: 0
ABDOMINAL DISTENTION: 0
CONSTIPATION: 0
SORE THROAT: 0
TROUBLE SWALLOWING: 0
EYE PAIN: 0
PHOTOPHOBIA: 0
ABDOMINAL PAIN: 0
COUGH: 0
BLOOD IN STOOL: 0
COLOR CHANGE: 0

## 2023-09-27 ASSESSMENT — PATIENT HEALTH QUESTIONNAIRE - PHQ9
SUM OF ALL RESPONSES TO PHQ QUESTIONS 1-9: 0
1. LITTLE INTEREST OR PLEASURE IN DOING THINGS: 0
SUM OF ALL RESPONSES TO PHQ QUESTIONS 1-9: 0
SUM OF ALL RESPONSES TO PHQ9 QUESTIONS 1 & 2: 0
SUM OF ALL RESPONSES TO PHQ QUESTIONS 1-9: 0
SUM OF ALL RESPONSES TO PHQ QUESTIONS 1-9: 0
2. FEELING DOWN, DEPRESSED OR HOPELESS: 0

## 2023-09-27 ASSESSMENT — VISUAL ACUITY
OD_CC: 20/25
OS_CC: 20/30

## 2023-09-27 NOTE — PROGRESS NOTES
Exam  Vitals and nursing note reviewed. Exam conducted with a chaperone present. Constitutional:       General: She is not in acute distress. Appearance: Normal appearance. She is obese. She is not ill-appearing, toxic-appearing or diaphoretic. HENT:      Head: Normocephalic and atraumatic. Right Ear: External ear normal.      Left Ear: External ear normal.      Nose: Nose normal. No congestion or rhinorrhea. Mouth/Throat:      Mouth: Mucous membranes are moist.      Pharynx: No oropharyngeal exudate. Eyes:      General: No scleral icterus. Right eye: No discharge. Left eye: No discharge. Extraocular Movements: Extraocular movements intact. Conjunctiva/sclera: Conjunctivae normal.      Pupils: Pupils are equal, round, and reactive to light. Cardiovascular:      Rate and Rhythm: Normal rate and regular rhythm. Pulses: Normal pulses. Heart sounds: Normal heart sounds. No murmur heard. No gallop. Comments: Markedly elevated blood pressure  Pulmonary:      Effort: Pulmonary effort is normal. No respiratory distress. Breath sounds: Normal breath sounds. No stridor. No wheezing, rhonchi or rales. Chest:      Chest wall: No tenderness. Abdominal:      General: Abdomen is flat. There is no distension. Palpations: Abdomen is soft. There is no mass. Tenderness: There is no abdominal tenderness. There is no right CVA tenderness, guarding or rebound. Hernia: No hernia is present. Genitourinary:     Vagina: No vaginal discharge. Musculoskeletal:         General: No swelling, tenderness, deformity or signs of injury. Cervical back: Normal range of motion and neck supple. No rigidity. Right lower leg: No edema. Left lower leg: No edema. Lymphadenopathy:      Cervical: No cervical adenopathy. Skin:     General: Skin is warm. Capillary Refill: Capillary refill takes less than 2 seconds.       Coloration: Skin is not

## 2023-09-28 LAB
EST. AVERAGE GLUCOSE BLD GHB EST-MCNC: 126 MG/DL
HBA1C MFR BLD: 6 % (ref 4.8–5.6)

## 2023-12-12 ENCOUNTER — CLINICAL DOCUMENTATION (OUTPATIENT)
Dept: SURGERY | Age: 58
End: 2023-12-12

## 2023-12-12 NOTE — PROGRESS NOTES
I have reviewed the patient's chart and consider the patient an acceptable risk for screening colonoscopy without a formal office visit. We will contact the patient to give the details of the bowel prep and to schedule screening colonoscopy in the near future. Colonoscopy: none on file in Epic  Anticoagulation: none  Family Hx: non contributory    Once the colonoscopy has been completed, the Health Maintenance will be updated accordingly.      Leeroy Plummer, APRN - CNP